# Patient Record
Sex: FEMALE | Race: WHITE | NOT HISPANIC OR LATINO | Employment: OTHER | URBAN - METROPOLITAN AREA
[De-identification: names, ages, dates, MRNs, and addresses within clinical notes are randomized per-mention and may not be internally consistent; named-entity substitution may affect disease eponyms.]

---

## 2024-04-20 ENCOUNTER — APPOINTMENT (EMERGENCY)
Dept: RADIOLOGY | Facility: HOSPITAL | Age: 69
End: 2024-04-20
Payer: COMMERCIAL

## 2024-04-20 ENCOUNTER — HOSPITAL ENCOUNTER (OUTPATIENT)
Facility: HOSPITAL | Age: 69
Setting detail: OBSERVATION
Discharge: HOME | End: 2024-04-21
Attending: EMERGENCY MEDICINE | Admitting: STUDENT IN AN ORGANIZED HEALTH CARE EDUCATION/TRAINING PROGRAM
Payer: COMMERCIAL

## 2024-04-20 DIAGNOSIS — R19.7 DIARRHEA: ICD-10-CM

## 2024-04-20 DIAGNOSIS — K52.9 PROCTOCOLITIS: Primary | ICD-10-CM

## 2024-04-20 DIAGNOSIS — R11.2 NAUSEA AND VOMITING: ICD-10-CM

## 2024-04-20 PROBLEM — M79.7 FIBROMYALGIA: Status: ACTIVE | Noted: 2024-04-20

## 2024-04-20 PROBLEM — E78.5 HLD (HYPERLIPIDEMIA): Status: ACTIVE | Noted: 2024-04-20

## 2024-04-20 LAB
ALBUMIN SERPL BCP-MCNC: 4.6 G/DL (ref 3.5–5)
ALP SERPL-CCNC: 91 U/L (ref 34–104)
ALT SERPL W P-5'-P-CCNC: 17 U/L (ref 7–52)
ANION GAP SERPL CALCULATED.3IONS-SCNC: 13 MMOL/L (ref 4–13)
AST SERPL W P-5'-P-CCNC: 22 U/L (ref 13–39)
BACTERIA UR QL AUTO: ABNORMAL /HPF
BASOPHILS # BLD MANUAL: 0 THOUSAND/UL (ref 0–0.1)
BASOPHILS NFR MAR MANUAL: 0 % (ref 0–1)
BILIRUB SERPL-MCNC: 1.11 MG/DL (ref 0.2–1)
BILIRUB UR QL STRIP: NEGATIVE
BUN SERPL-MCNC: 14 MG/DL (ref 5–25)
CALCIUM SERPL-MCNC: 9.7 MG/DL (ref 8.4–10.2)
CHLORIDE SERPL-SCNC: 100 MMOL/L (ref 96–108)
CLARITY UR: CLEAR
CO2 SERPL-SCNC: 24 MMOL/L (ref 21–32)
COLOR UR: YELLOW
CREAT SERPL-MCNC: 0.77 MG/DL (ref 0.6–1.3)
DACRYOCYTES BLD QL SMEAR: PRESENT
EOSINOPHIL # BLD MANUAL: 0 THOUSAND/UL (ref 0–0.4)
EOSINOPHIL NFR BLD MANUAL: 0 % (ref 0–6)
ERYTHROCYTE [DISTWIDTH] IN BLOOD BY AUTOMATED COUNT: 13.2 % (ref 11.6–15.1)
GFR SERPL CREATININE-BSD FRML MDRD: 79 ML/MIN/1.73SQ M
GLUCOSE SERPL-MCNC: 171 MG/DL (ref 65–140)
GLUCOSE UR STRIP-MCNC: ABNORMAL MG/DL
HCT VFR BLD AUTO: 44.3 % (ref 34.8–46.1)
HGB BLD-MCNC: 14.6 G/DL (ref 11.5–15.4)
HGB UR QL STRIP.AUTO: ABNORMAL
KETONES UR STRIP-MCNC: ABNORMAL MG/DL
LEUKOCYTE ESTERASE UR QL STRIP: NEGATIVE
LIPASE SERPL-CCNC: <6 U/L (ref 11–82)
LYMPHOCYTES # BLD AUTO: 0.44 THOUSAND/UL (ref 0.6–4.47)
LYMPHOCYTES # BLD AUTO: 2 % (ref 14–44)
MCH RBC QN AUTO: 28.6 PG (ref 26.8–34.3)
MCHC RBC AUTO-ENTMCNC: 33 G/DL (ref 31.4–37.4)
MCV RBC AUTO: 87 FL (ref 82–98)
MONOCYTES # BLD AUTO: 0.22 THOUSAND/UL (ref 0–1.22)
MONOCYTES NFR BLD: 2 % (ref 4–12)
NEUTROPHILS # BLD MANUAL: 10.16 THOUSAND/UL (ref 1.85–7.62)
NEUTS BAND NFR BLD MANUAL: 1 % (ref 0–8)
NEUTS SEG NFR BLD AUTO: 91 % (ref 43–75)
NITRITE UR QL STRIP: NEGATIVE
NON-SQ EPI CELLS URNS QL MICRO: ABNORMAL /HPF
PH UR STRIP.AUTO: 7 [PH] (ref 4.5–8)
PLASMA CELLS NFR BLD: 1 % (ref 0–0)
PLATELET # BLD AUTO: 321 THOUSANDS/UL (ref 149–390)
PLATELET BLD QL SMEAR: ADEQUATE
PMV BLD AUTO: 10.7 FL (ref 8.9–12.7)
POIKILOCYTOSIS BLD QL SMEAR: PRESENT
POTASSIUM SERPL-SCNC: 4.1 MMOL/L (ref 3.5–5.3)
PROMYELOCYTE ABSOLUTE CT: 0.22 THOUSAND/UL (ref 0–0)
PROMYELOCYTES NFR BLD MANUAL: 2 % (ref 0–0)
PROT SERPL-MCNC: 7.7 G/DL (ref 6.4–8.4)
PROT UR STRIP-MCNC: NEGATIVE MG/DL
RBC # BLD AUTO: 5.1 MILLION/UL (ref 3.81–5.12)
RBC #/AREA URNS AUTO: ABNORMAL /HPF
RBC MORPH BLD: PRESENT
SODIUM SERPL-SCNC: 137 MMOL/L (ref 135–147)
SP GR UR STRIP.AUTO: 1.02 (ref 1–1.03)
UROBILINOGEN UR QL STRIP.AUTO: 0.2 E.U./DL
VARIANT LYMPHS # BLD AUTO: 1 %
WBC # BLD AUTO: 11.04 THOUSAND/UL (ref 4.31–10.16)
WBC #/AREA URNS AUTO: ABNORMAL /HPF

## 2024-04-20 PROCEDURE — 80053 COMPREHEN METABOLIC PANEL: CPT

## 2024-04-20 PROCEDURE — 74177 CT ABD & PELVIS W/CONTRAST: CPT

## 2024-04-20 PROCEDURE — 1124F ACP DISCUSS-NO DSCNMKR DOCD: CPT | Performed by: EMERGENCY MEDICINE

## 2024-04-20 PROCEDURE — 96374 THER/PROPH/DIAG INJ IV PUSH: CPT

## 2024-04-20 PROCEDURE — 96361 HYDRATE IV INFUSION ADD-ON: CPT

## 2024-04-20 PROCEDURE — 99284 EMERGENCY DEPT VISIT MOD MDM: CPT

## 2024-04-20 PROCEDURE — 85027 COMPLETE CBC AUTOMATED: CPT

## 2024-04-20 PROCEDURE — 83690 ASSAY OF LIPASE: CPT

## 2024-04-20 PROCEDURE — 81001 URINALYSIS AUTO W/SCOPE: CPT

## 2024-04-20 PROCEDURE — 99285 EMERGENCY DEPT VISIT HI MDM: CPT | Performed by: EMERGENCY MEDICINE

## 2024-04-20 PROCEDURE — 96375 TX/PRO/DX INJ NEW DRUG ADDON: CPT

## 2024-04-20 PROCEDURE — 99223 1ST HOSP IP/OBS HIGH 75: CPT | Performed by: STUDENT IN AN ORGANIZED HEALTH CARE EDUCATION/TRAINING PROGRAM

## 2024-04-20 PROCEDURE — 36415 COLL VENOUS BLD VENIPUNCTURE: CPT

## 2024-04-20 PROCEDURE — 85007 BL SMEAR W/DIFF WBC COUNT: CPT

## 2024-04-20 RX ORDER — METRONIDAZOLE 500 MG/100ML
500 INJECTION, SOLUTION INTRAVENOUS EVERY 8 HOURS
Status: DISCONTINUED | OUTPATIENT
Start: 2024-04-20 | End: 2024-04-21 | Stop reason: HOSPADM

## 2024-04-20 RX ORDER — PRAVASTATIN SODIUM 40 MG
40 TABLET ORAL DAILY
Status: DISCONTINUED | OUTPATIENT
Start: 2024-04-21 | End: 2024-04-21 | Stop reason: HOSPADM

## 2024-04-20 RX ORDER — ONDANSETRON 2 MG/ML
4 INJECTION INTRAMUSCULAR; INTRAVENOUS ONCE
Status: COMPLETED | OUTPATIENT
Start: 2024-04-20 | End: 2024-04-20

## 2024-04-20 RX ORDER — ONDANSETRON 2 MG/ML
4 INJECTION INTRAMUSCULAR; INTRAVENOUS EVERY 6 HOURS PRN
Status: DISCONTINUED | OUTPATIENT
Start: 2024-04-20 | End: 2024-04-21 | Stop reason: HOSPADM

## 2024-04-20 RX ORDER — DROPERIDOL 2.5 MG/ML
1.25 INJECTION, SOLUTION INTRAMUSCULAR; INTRAVENOUS ONCE
Status: COMPLETED | OUTPATIENT
Start: 2024-04-20 | End: 2024-04-20

## 2024-04-20 RX ORDER — PRAVASTATIN SODIUM 40 MG
40 TABLET ORAL DAILY
COMMUNITY

## 2024-04-20 RX ORDER — SODIUM CHLORIDE, SODIUM GLUCONATE, SODIUM ACETATE, POTASSIUM CHLORIDE, MAGNESIUM CHLORIDE, SODIUM PHOSPHATE, DIBASIC, AND POTASSIUM PHOSPHATE .53; .5; .37; .037; .03; .012; .00082 G/100ML; G/100ML; G/100ML; G/100ML; G/100ML; G/100ML; G/100ML
100 INJECTION, SOLUTION INTRAVENOUS CONTINUOUS
Status: DISCONTINUED | OUTPATIENT
Start: 2024-04-20 | End: 2024-04-21 | Stop reason: HOSPADM

## 2024-04-20 RX ORDER — SUCRALFATE 1 G/1
1 TABLET ORAL ONCE
Status: COMPLETED | OUTPATIENT
Start: 2024-04-20 | End: 2024-04-20

## 2024-04-20 RX ORDER — ENOXAPARIN SODIUM 100 MG/ML
40 INJECTION SUBCUTANEOUS DAILY
Status: DISCONTINUED | OUTPATIENT
Start: 2024-04-21 | End: 2024-04-21 | Stop reason: HOSPADM

## 2024-04-20 RX ORDER — FAMOTIDINE 10 MG/ML
20 INJECTION, SOLUTION INTRAVENOUS ONCE
Status: COMPLETED | OUTPATIENT
Start: 2024-04-20 | End: 2024-04-20

## 2024-04-20 RX ORDER — DICYCLOMINE HCL 20 MG
20 TABLET ORAL
Status: DISCONTINUED | OUTPATIENT
Start: 2024-04-20 | End: 2024-04-21 | Stop reason: HOSPADM

## 2024-04-20 RX ADMIN — ONDANSETRON 4 MG: 2 INJECTION INTRAMUSCULAR; INTRAVENOUS at 12:10

## 2024-04-20 RX ADMIN — METRONIDAZOLE 500 MG: 500 INJECTION, SOLUTION INTRAVENOUS at 16:10

## 2024-04-20 RX ADMIN — FAMOTIDINE 20 MG: 10 INJECTION INTRAVENOUS at 12:17

## 2024-04-20 RX ADMIN — DICYCLOMINE HYDROCHLORIDE 20 MG: 20 TABLET ORAL at 18:03

## 2024-04-20 RX ADMIN — SODIUM CHLORIDE, SODIUM GLUCONATE, SODIUM ACETATE, POTASSIUM CHLORIDE, MAGNESIUM CHLORIDE, SODIUM PHOSPHATE, DIBASIC, AND POTASSIUM PHOSPHATE 100 ML/HR: .53; .5; .37; .037; .03; .012; .00082 INJECTION, SOLUTION INTRAVENOUS at 17:33

## 2024-04-20 RX ADMIN — DROPERIDOL 1.25 MG: 2.5 INJECTION, SOLUTION INTRAMUSCULAR; INTRAVENOUS at 13:32

## 2024-04-20 RX ADMIN — SODIUM CHLORIDE, SODIUM GLUCONATE, SODIUM ACETATE, POTASSIUM CHLORIDE, MAGNESIUM CHLORIDE, SODIUM PHOSPHATE, DIBASIC, AND POTASSIUM PHOSPHATE 100 ML/HR: .53; .5; .37; .037; .03; .012; .00082 INJECTION, SOLUTION INTRAVENOUS at 22:47

## 2024-04-20 RX ADMIN — SUCRALFATE 1 G: 1 TABLET ORAL at 12:10

## 2024-04-20 RX ADMIN — DICYCLOMINE HYDROCHLORIDE 20 MG: 20 TABLET ORAL at 22:46

## 2024-04-20 RX ADMIN — IOHEXOL 100 ML: 350 INJECTION, SOLUTION INTRAVENOUS at 13:57

## 2024-04-20 RX ADMIN — SODIUM CHLORIDE 1000 ML: 0.9 INJECTION, SOLUTION INTRAVENOUS at 12:11

## 2024-04-20 NOTE — H&P
[facility]  H&P  Name: Bessy East 69 y.o. female I MRN: [id number]  Unit/Bed#: ED 27 I Date of Admission: 4/20/2024   Date of Service: 4/20/2024 I Hospital Day: 0      Assessment/Plan   Proctocolitis  Assessment & Plan  Presenting with 1 week history of diarrhea which appears to be improving few days ago however became worse 1 day prior to admission with accompanying nausea and lower abdominal pain with poor oral intake over the past 1 week.  Diarrhea appears to be slightly improved and she has not had any further bowel movements in the last 1 hour.  Reports having 8 bowel movements last night.  Recent antibiotic use for dental infection prophylaxis approximately 1 month ago, patient does not know which antibiotic she received.  Mild leukocytosis of 11.04 in the ED, lipase negative  Afebrile with no SIRS  CT imaging with thickened sigmoid and rectum compatible with nonspecific proctocolitis  Received Pepcid, droperidol, Zofran, 1 L NS bolus, Carafate in the ED with improvement in symptoms  Patient was started on Flagyl in ED, continue  Clear liquid diet, advance as tolerated  Stool studies ordered  Antiemetics Bentyl as needed ordered  IV fluids    Fibromyalgia  Assessment & Plan  Previously on Cymbalta and gabapentin, no longer taking  Supportive care    HLD (hyperlipidemia)  Assessment & Plan  Continue pravastatin         VTE Pharmacologic Prophylaxis: VTE Score: 3 Moderate Risk (Score 3-4) - Pharmacological DVT Prophylaxis Ordered: enoxaparin (Lovenox).  Code Status: Level 1 - Full Code   Discussion with family: Patient declined call to .     Anticipated Length of Stay: Patient will be admitted on an observation basis with an anticipated length of stay of less than 2 midnights secondary to proctocolitis, diarrhea requiring IV fluids anticipate discharge tomorrow if patient is tolerating diet.    Total Time Spent on Date of Encounter in care of patient: 65  mins. This time was spent on one or more of the following: performing physical exam; counseling and coordination of care; obtaining or reviewing history; documenting in the medical record; reviewing/ordering tests, medications or procedures; communicating with other healthcare professionals and discussing with patient's family/caregivers.    Chief Complaint: Diarrhea, abdominal pain    History of Present Illness:  Bessy East is a 69 y.o. female with a PMH of fibromyalgia, hiatal hernia s/p repair who presents with 1 week history of diarrhea with poor oral intake.  Patient reports that her diarrhea started approximately 1 week ago and appeared to be improving 3 days ago however became worse last night fluids as much as 8 episodes of bowel movement.  She was already concert last night.  Denies eating much anything for the last 1 week due to nausea and diarrhea and poor appetite.  Received unknown antibiotics approximately 1 month ago when she needed to have a dental implant placed.  No fevers or chills.  Reports mild left abdominal crampy pain.  Reports some blood noted in her stool last night towards end of her multiple episodes, none today.  Hypertensive on admission however repeat blood pressures improved significantly.  Mild leukocytosis at 11.  Lipase negative.  CT abdomen pelvis with signs of thickened sigmoid and rectum compatible with nonspecific proctocolitis.  No prior history of IBD but does report history of IBS approximately 30 years ago.  Patient reports feeling better after she received droperidol, 1 L fluid bolus, Zofran, Carafate, Pepcid in the ED.  No further bowel movements in the last 5 hours.    She currently resides in [place] and was only here for the concert with plans to return back to [place] tomorrow if she is feeling better.    Review of Systems:  Review of Systems   Constitutional:  Positive for chills. Negative for fever.   HENT:  Negative for congestion and sinus pressure.     Eyes: Negative.    Respiratory: Negative.     Cardiovascular: Negative.    Gastrointestinal:  Positive for abdominal pain, diarrhea and nausea. Negative for constipation and vomiting.   Genitourinary:  Negative for dyspareunia and dysuria.   Musculoskeletal:  Negative for back pain and gait problem.   Skin:  Negative for rash and wound.   Neurological:  Negative for light-headedness and headaches.   Psychiatric/Behavioral:  Negative for agitation and confusion.        Past Medical and Surgical History:   Past Medical History:   Diagnosis Date    Hypercholesteremia        No past surgical history on file.    Meds/Allergies:  Prior to Admission medications    Medication Sig Start Date End Date Taking? Authorizing Provider   pravastatin (PRAVACHOL) 40 mg tablet Take 40 mg by mouth daily   Yes Historical Provider, MD DIETRICH have reviewed home medications with patient personally.    Allergies:   Allergies   Allergen Reactions    Penicillins Hives    Sulfa Antibiotics Hives       Social History:  Marital Status: /Civil Union   Substance Use History:   Social History     Substance and Sexual Activity   Alcohol Use Not on file     Social History     Tobacco Use   Smoking Status Not on file   Smokeless Tobacco Not on file     Social History     Substance and Sexual Activity   Drug Use Not on file       Family History:  No family history on file.    Physical Exam:     Vitals:   Blood Pressure: 113/62 (04/20/24 1739)  Pulse: 64 (04/20/24 1739)  Temperature: 97.6 °F (36.4 °C) (04/20/24 1115)  Respirations: 16 (04/20/24 1739)  SpO2: 93 % (04/20/24 1739)    Physical Exam  Vitals reviewed.   Constitutional:       General: She is not in acute distress.     Appearance: Normal appearance. She is not ill-appearing.   HENT:      Head: Normocephalic and atraumatic.   Cardiovascular:      Rate and Rhythm: Normal rate and regular rhythm.      Heart sounds: Normal heart sounds.   Pulmonary:      Effort: Pulmonary effort is normal.  No respiratory distress.   Abdominal:      General: Bowel sounds are normal. There is no distension.      Palpations: Abdomen is soft.      Tenderness: There is abdominal tenderness (Mild tenderness in the left lower quadrant). There is no guarding or rebound.   Musculoskeletal:      Right lower leg: No edema.      Left lower leg: No edema.   Skin:     General: Skin is warm and dry.   Neurological:      Mental Status: She is alert and oriented to person, place, and time. Mental status is at baseline.          Additional Data:     Lab Results:  Results from last 7 days   Lab Units 04/20/24  1210   WBC Thousand/uL 11.04*   HEMOGLOBIN g/dL 14.6   HEMATOCRIT % 44.3   PLATELETS Thousands/uL 321   BANDS PCT % 1   LYMPHO PCT % 2*   MONO PCT % 2*   EOS PCT % 0     Results from last 7 days   Lab Units 04/20/24  1210   SODIUM mmol/L 137   POTASSIUM mmol/L 4.1   CHLORIDE mmol/L 100   CO2 mmol/L 24   BUN mg/dL 14   CREATININE mg/dL 0.77   ANION GAP mmol/L 13   CALCIUM mg/dL 9.7   ALBUMIN g/dL 4.6   TOTAL BILIRUBIN mg/dL 1.11*   ALK PHOS U/L 91   ALT U/L 17   AST U/L 22   GLUCOSE RANDOM mg/dL 171*                       Lines/Drains:  Invasive Devices       Peripheral Intravenous Line  Duration             Peripheral IV 04/20/24 Left;Proximal;Ventral (anterior) Forearm <1 day                        Imaging: Reviewed radiology reports from this admission including: abdominal/pelvic CT  CT abdomen pelvis with contrast   Final Result by Abel Durham MD (04/20 1446)      Thickened sigmoid and rectum compatible with a nonspecific proctocolitis.      The study was marked in EPIC for immediate notification.      Workstation performed: [id number]             EKG and Other Studies Reviewed on Admission:   EKG: No EKG obtained.    ** Please Note: This note has been constructed using a voice recognition system. **

## 2024-04-20 NOTE — ASSESSMENT & PLAN NOTE
Presenting with 1 week history of diarrhea which appears to be improving few days ago however became worse 1 day prior to admission with accompanying nausea and lower abdominal pain with poor oral intake over the past 1 week.  Diarrhea appears to be slightly improved and she has not had any further bowel movements in the last 1 hour.  Reports having 8 bowel movements last night.  Recent antibiotic use for dental infection prophylaxis approximately 1 month ago, patient does not know which antibiotic she received.  Mild leukocytosis of 11.04 in the ED, lipase negative  Afebrile with no SIRS  CT imaging with thickened sigmoid and rectum compatible with nonspecific proctocolitis  Received Pepcid, droperidol, Zofran, 1 L NS bolus, Carafate in the ED with improvement in symptoms  Patient was started on Flagyl in ED, continue  Clear liquid diet, advance as tolerated  Stool studies ordered  Antiemetics Bentyl as needed ordered  IV fluids

## 2024-04-20 NOTE — ED PROVIDER NOTES
History  Chief Complaint   Patient presents with    Diarrhea     Pt having diarrhea and nausea since Monday, states it is worse today, last BM  hr ago     69-year-old female with history of hyperlipidemia, IBS, recent tick bite treated with antibiotics, just finished course of antibiotics for dental infection, presents emergency department due to abdominal pain, diarrhea, nausea and vomiting.  Symptoms have progressed over the past week.  She was at a concert last night and developed recurrent episodes of emesis throughout the night.  She has not been able to hold anything down today.  She also having diarrhea that she describes as darker in color than typical.  Her friend brought her to the hospital because she looked unwell. Denies f/c, cp, sob, or other complaints.     Prior to Admission Medications   Prescriptions Last Dose Informant Patient Reported? Taking?   pravastatin (PRAVACHOL) 40 mg tablet 4/19/2024  Yes Yes   Sig: Take 40 mg by mouth daily      Facility-Administered Medications: None       Past Medical History:   Diagnosis Date    Hypercholesteremia        No past surgical history on file.    No family history on file.  I have reviewed and agree with the history as documented.    E-Cigarette/Vaping     E-Cigarette/Vaping Substances           Review of Systems   All other systems reviewed and are negative.      Physical Exam  ED Triage Vitals   Temperature Pulse Respirations Blood Pressure SpO2   04/20/24 1115 04/20/24 1115 04/20/24 1115 04/20/24 1115 04/20/24 1115   97.6 °F (36.4 °C) 60 20 (!) 189/98 100 %      Temp Source Heart Rate Source Patient Position - Orthostatic VS BP Location FiO2 (%)   04/21/24 0752 04/20/24 1230 04/20/24 1230 04/20/24 1230 --   Oral Monitor Lying Right arm       Pain Score       04/20/24 1300       5             Orthostatic Vital Signs  Vitals:    04/20/24 1739 04/20/24 1800 04/20/24 2222 04/21/24 0752   BP: 113/62 118/75 106/71 98/60   Pulse: 64 74 60 60   Patient Position  - Orthostatic VS: Lying Sitting  Lying       Physical Exam  Vitals and nursing note reviewed.   Constitutional:       General: She is not in acute distress.     Appearance: She is well-developed.   HENT:      Head: Normocephalic and atraumatic.   Eyes:      Conjunctiva/sclera: Conjunctivae normal.   Cardiovascular:      Rate and Rhythm: Normal rate and regular rhythm.      Heart sounds: No murmur heard.  Pulmonary:      Effort: Pulmonary effort is normal. No respiratory distress.      Breath sounds: Normal breath sounds.   Abdominal:      Palpations: Abdomen is soft.      Tenderness: There is abdominal tenderness (epigastric).   Musculoskeletal:         General: No swelling.      Cervical back: Neck supple.   Skin:     General: Skin is warm and dry.      Capillary Refill: Capillary refill takes less than 2 seconds.   Neurological:      Mental Status: She is alert.   Psychiatric:         Mood and Affect: Mood normal.         ED Medications  Medications   metroNIDAZOLE (FLAGYL) IVPB (premix) 500 mg 100 mL (500 mg Intravenous New Bag 4/21/24 0950)   pravastatin (PRAVACHOL) tablet 40 mg (40 mg Oral Given 4/21/24 0950)   enoxaparin (LOVENOX) subcutaneous injection 40 mg (40 mg Subcutaneous Not Given 4/21/24 0959)   ondansetron (ZOFRAN) injection 4 mg (has no administration in time range)   multi-electrolyte (PLASMALYTE-A/ISOLYTE-S PH 7.4) IV solution (100 mL/hr Intravenous New Bag 4/20/24 2247)   dicyclomine (BENTYL) tablet 20 mg (20 mg Oral Given 4/21/24 1058)   ondansetron (ZOFRAN) injection 4 mg (4 mg Intravenous Given 4/20/24 1210)   Famotidine (PF) (PEPCID) injection 20 mg (20 mg Intravenous Given 4/20/24 1217)   sucralfate (CARAFATE) tablet 1 g (1 g Oral Given 4/20/24 1210)   sodium chloride 0.9 % bolus 1,000 mL (0 mL Intravenous Stopped 4/20/24 1430)   droperidol (INAPSINE) injection 1.25 mg (1.25 mg Intravenous Given 4/20/24 1332)   iohexol (OMNIPAQUE) 350 MG/ML injection (MULTI-DOSE) 100 mL (100 mL Intravenous  Given 4/20/24 1357)   potassium chloride (Klor-Con M20) CR tablet 40 mEq (40 mEq Oral Given 4/21/24 0950)       Diagnostic Studies  Results Reviewed       Procedure Component Value Units Date/Time    Basic metabolic panel [[id number]]  (Abnormal) Collected: 04/21/24 0447    Lab Status: Final result Specimen: Blood from Arm, Left Updated: 04/21/24 0533     Sodium 139 mmol/L      Potassium 3.3 mmol/L      Chloride 104 mmol/L      CO2 26 mmol/L      ANION GAP 9 mmol/L      BUN 12 mg/dL      Creatinine 0.93 mg/dL      Glucose 92 mg/dL      Calcium 9.0 mg/dL      eGFR 63 ml/min/1.73sq m     Narrative:      National Kidney Disease Foundation guidelines for Chronic Kidney Disease (CKD):     Stage 1 with normal or high GFR (GFR > 90 mL/min/1.73 square meters)    Stage 2 Mild CKD (GFR = 60-89 mL/min/1.73 square meters)    Stage 3A Moderate CKD (GFR = 45-59 mL/min/1.73 square meters)    Stage 3B Moderate CKD (GFR = 30-44 mL/min/1.73 square meters)    Stage 4 Severe CKD (GFR = 15-29 mL/min/1.73 square meters)    Stage 5 End Stage CKD (GFR <15 mL/min/1.73 square meters)  Note: GFR calculation is accurate only with a steady state creatinine    CBC and differential [[id number]] Collected: 04/21/24 0447    Lab Status: Final result Specimen: Blood from Arm, Left Updated: 04/21/24 0517     WBC 8.52 Thousand/uL      RBC 4.60 Million/uL      Hemoglobin 12.9 g/dL      Hematocrit 40.3 %      MCV 88 fL      MCH 28.0 pg      MCHC 32.0 g/dL      RDW 13.2 %      MPV 10.5 fL      Platelets 296 Thousands/uL      nRBC 0 /100 WBCs      Segmented % 66 %      Immature Grans % 0 %      Lymphocytes % 22 %      Monocytes % 11 %      Eosinophils Relative 0 %      Basophils Relative 1 %      Absolute Neutrophils 5.62 Thousands/µL      Absolute Immature Grans 0.02 Thousand/uL      Absolute Lymphocytes 1.88 Thousands/µL      Absolute Monocytes 0.92 Thousand/µL      Eosinophils Absolute 0.03 Thousand/µL      Basophils Absolute 0.05 Thousands/µL      Clostridium difficile toxin by PCR with EIA [[id number]]     Lab Status: No result Specimen: Stool from Per Rectum     Stool Enteric Bacterial Panel by PCR [[id number]]     Lab Status: No result Specimen: Stool from Rectum     Urine Microscopic [[id number]]  (Abnormal) Collected: 04/20/24 1352    Lab Status: Final result Specimen: Urine, Clean Catch Updated: 04/20/24 1459     RBC, UA 10-20 /hpf      WBC, UA None Seen /hpf      Epithelial Cells None Seen /hpf      Bacteria, UA None Seen /hpf     Urine Macroscopic, POC [[id number]]  (Abnormal) Collected: 04/20/24 1352    Lab Status: Final result Specimen: Urine Updated: 04/20/24 1353     Color, UA Yellow     Clarity, UA Clear     pH, UA 7.0     Leukocytes, UA Negative     Nitrite, UA Negative     Protein, UA Negative mg/dl      Glucose,  (1/10%) mg/dl      Ketones, UA 40 (2+) mg/dl      Urobilinogen, UA 0.2 E.U./dl      Bilirubin, UA Negative     Occult Blood, UA Small     Specific Gravity, UA 1.025    Narrative:      CLINITEK RESULT    Comprehensive metabolic panel [[id number]]  (Abnormal) Collected: 04/20/24 1210    Lab Status: Final result Specimen: Blood from Arm, Left Updated: 04/20/24 1308     Sodium 137 mmol/L      Potassium 4.1 mmol/L      Chloride 100 mmol/L      CO2 24 mmol/L      ANION GAP 13 mmol/L      BUN 14 mg/dL      Creatinine 0.77 mg/dL      Glucose 171 mg/dL      Calcium 9.7 mg/dL      AST 22 U/L      ALT 17 U/L      Alkaline Phosphatase 91 U/L      Total Protein 7.7 g/dL      Albumin 4.6 g/dL      Total Bilirubin 1.11 mg/dL      eGFR 79 ml/min/1.73sq m     Narrative:      National Kidney Disease Foundation guidelines for Chronic Kidney Disease (CKD):     Stage 1 with normal or high GFR (GFR > 90 mL/min/1.73 square meters)    Stage 2 Mild CKD (GFR = 60-89 mL/min/1.73 square meters)    Stage 3A Moderate CKD (GFR = 45-59 mL/min/1.73 square meters)    Stage 3B Moderate CKD (GFR = 30-44 mL/min/1.73 square meters)    Stage 4 Severe CKD (GFR = 15-29  mL/min/1.73 square meters)    Stage 5 End Stage CKD (GFR <15 mL/min/1.73 square meters)  Note: GFR calculation is accurate only with a steady state creatinine    Lipase [[id number]]  (Abnormal) Collected: 04/20/24 1210    Lab Status: Final result Specimen: Blood from Arm, Left Updated: 04/20/24 1308     Lipase <6 u/L     RBC Morphology Reflex Test [[id number]] Collected: 04/20/24 1210    Lab Status: Final result Specimen: Blood from Arm, Left Updated: 04/20/24 1302    CBC and differential [[id number]]  (Abnormal) Collected: 04/20/24 1210    Lab Status: Final result Specimen: Blood from Arm, Left Updated: 04/20/24 1252     WBC 11.04 Thousand/uL      RBC 5.10 Million/uL      Hemoglobin 14.6 g/dL      Hematocrit 44.3 %      MCV 87 fL      MCH 28.6 pg      MCHC 33.0 g/dL      RDW 13.2 %      MPV 10.7 fL      Platelets 321 Thousands/uL     Narrative:      This is an appended report.  These results have been appended to a previously verified report.    Manual Differential(PHLEBS Do Not Order) [[id number]]  (Abnormal) Collected: 04/20/24 1210    Lab Status: Final result Specimen: Blood from Arm, Left Updated: 04/20/24 1252     Segmented % 91 %      Bands % 1 %      Lymphocytes % 2 %      Monocytes % 2 %      Eosinophils % 0 %      Basophils % 0 %      Promyelocytes % 2 %      Atypical Lymphocytes % 1 %      Plasma Cells % 1 %      Absolute Neutrophils 10.16 Thousand/uL      Absolute Lymphocytes 0.44 Thousand/uL      Absolute Monocytes 0.22 Thousand/uL      Absolute Eosinophils 0.00 Thousand/uL      Absolute Basophils 0.00 Thousand/uL      Absolute Promyelocytes 0.22 Thousand/uL      Total Counted --     RBC Morphology Present     Platelet Estimate Adequate     Poikilocytes Present     Tear Drop Cells Present                   CT abdomen pelvis with contrast   Final Result by Abel Durham MD (04/20 1446)      Thickened sigmoid and rectum compatible with a nonspecific proctocolitis.      The study was marked in EPIC  for immediate notification.      Workstation performed: [id number]               Procedures  Procedures      ED Course  ED Course as of 04/21/24 1327   Sat Apr 20, 2024   1314 Total Bilirubin(!): 1.11                             SBIRT 20yo+      Flowsheet Row Most Recent Value   Initial Alcohol Screen: US AUDIT-C     1. How often do you have a drink containing alcohol? 0 Filed at: 04/20/2024 1306   2. How many drinks containing alcohol do you have on a typical day you are drinking?  0 Filed at: 04/20/2024 1306   3a. Male UNDER 65: How often do you have five or more drinks on one occasion? 0 Filed at: 04/20/2024 1306   3b. FEMALE Any Age, or MALE 65+: How often do you have 4 or more drinks on one occassion? 0 Filed at: 04/20/2024 1306   Audit-C Score 0 Filed at: 04/20/2024 1306   MARYURI: How many times in the past year have you...    Used an illegal drug or used a prescription medication for non-medical reasons? Never Filed at: 04/20/2024 1306                  Medical Decision Making  61-year-old female present emergency department due to diffuse diarrhea, vomiting, unclear etiology.  Will obtain abdominal labs, CT abdomen pelvis to evaluate for surgical pathology.  Labs to evaluate for leukocytosis, electrolyte abnormalities.  Symptomatic management provided.  Workup showing proctocolitis.  Patient requiring multiple repeat medications to try and control vomiting and discomfort.  Patient unable to tolerate p.o. intake.  Will treat with antibiotics.  Discussed options including home management versus admission for further control of emesis.  Patient opting towards admission at this time.    Amount and/or Complexity of Data Reviewed  Labs: ordered. Decision-making details documented in ED Course.  Radiology: ordered.    Risk  Prescription drug management.  Decision regarding hospitalization.          Disposition  Final diagnoses:   Proctocolitis   Nausea and vomiting   Diarrhea     Time reflects when diagnosis was  documented in both MDM as applicable and the Disposition within this note       Time User Action Codes Description Comment    4/20/2024  4:08 PM Yokubaitis, Armani Add [K52.9] Proctocolitis     4/20/2024  4:09 PM Yokubaitis, Armani Add [R11.2] Nausea and vomiting     4/20/2024  4:09 PM Yokubaitis, Armani Add [R19.7] Diarrhea           ED Disposition       ED Disposition   Admit    Condition   Stable    Date/Time   Sat Apr 20, 2024 1616    Comment   Case was discussed with selma and the patient's admission status was agreed to be Admission Status: observation status to the service of slim .               Follow-up Information    None         Discharge Medication List as of 4/21/2024 10:02 AM        START taking these medications    Details   metroNIDAZOLE (FLAGYL) 500 mg tablet Take 1 tablet (500 mg total) by mouth every 8 (eight) hours for 6 days, Starting Sun 4/21/2024, Until Sat 4/27/2024, Normal      ondansetron (ZOFRAN) 4 mg tablet Take 1 tablet (4 mg total) by mouth every 8 (eight) hours as needed for nausea or vomiting, Starting Sun 4/21/2024, Normal           CONTINUE these medications which have NOT CHANGED    Details   pravastatin (PRAVACHOL) 40 mg tablet Take 40 mg by mouth daily, Historical Med           Outpatient Discharge Orders   Discharge Diet     Activity as tolerated     Call provider for:  persistent nausea or vomiting     Call provider for:  severe uncontrolled pain       PDMP Review       None             ED Provider  Attending physically available and evaluated Bessy East. I managed the patient along with the ED Attending.    Electronically Signed by           Armani Rodriguez MD  04/21/24 6489

## 2024-04-20 NOTE — ED ATTENDING ATTESTATION
4/20/2024  I, Geovani Hratley MD, saw and evaluated the patient. I have discussed the patient with the resident/non-physician practitioner and agree with the resident's/non-physician practitioner's findings, Plan of Care, and MDM as documented in the resident's/non-physician practitioner's note, except where noted. All available labs and Radiology studies were reviewed.  I was present for key portions of any procedure(s) performed by the resident/non-physician practitioner and I was immediately available to provide assistance.       At this point I agree with the current assessment done in the Emergency Department.  I have conducted an independent evaluation of this patient a history and physical is as follows:    69-year-old woman presenting with lower abdominal pain, nausea, vomiting and diarrhea.  On exam she is awake and alert, uncomfortable appearing.  Heart regular rate and rhythm, no murmurs rubs or gallops.  Lungs clear to auscultation bilaterally.  Abdomen soft and nondistended.  There is bilateral lower quadrant tenderness with no rebound or guarding.  Skin warm and dry.  Will get labs, imaging, treat symptoms.    ED Course         Critical Care Time  Procedures

## 2024-04-21 VITALS
OXYGEN SATURATION: 96 % | SYSTOLIC BLOOD PRESSURE: 98 MMHG | HEART RATE: 60 BPM | DIASTOLIC BLOOD PRESSURE: 60 MMHG | RESPIRATION RATE: 16 BRPM | TEMPERATURE: 98.4 F

## 2024-04-21 LAB
ANION GAP SERPL CALCULATED.3IONS-SCNC: 9 MMOL/L (ref 4–13)
BASOPHILS # BLD AUTO: 0.05 THOUSANDS/ÂΜL (ref 0–0.1)
BASOPHILS NFR BLD AUTO: 1 % (ref 0–1)
BUN SERPL-MCNC: 12 MG/DL (ref 5–25)
CALCIUM SERPL-MCNC: 9 MG/DL (ref 8.4–10.2)
CHLORIDE SERPL-SCNC: 104 MMOL/L (ref 96–108)
CO2 SERPL-SCNC: 26 MMOL/L (ref 21–32)
CREAT SERPL-MCNC: 0.93 MG/DL (ref 0.6–1.3)
EOSINOPHIL # BLD AUTO: 0.03 THOUSAND/ÂΜL (ref 0–0.61)
EOSINOPHIL NFR BLD AUTO: 0 % (ref 0–6)
ERYTHROCYTE [DISTWIDTH] IN BLOOD BY AUTOMATED COUNT: 13.2 % (ref 11.6–15.1)
GFR SERPL CREATININE-BSD FRML MDRD: 63 ML/MIN/1.73SQ M
GLUCOSE SERPL-MCNC: 92 MG/DL (ref 65–140)
HCT VFR BLD AUTO: 40.3 % (ref 34.8–46.1)
HGB BLD-MCNC: 12.9 G/DL (ref 11.5–15.4)
IMM GRANULOCYTES # BLD AUTO: 0.02 THOUSAND/UL (ref 0–0.2)
IMM GRANULOCYTES NFR BLD AUTO: 0 % (ref 0–2)
LYMPHOCYTES # BLD AUTO: 1.88 THOUSANDS/ÂΜL (ref 0.6–4.47)
LYMPHOCYTES NFR BLD AUTO: 22 % (ref 14–44)
MCH RBC QN AUTO: 28 PG (ref 26.8–34.3)
MCHC RBC AUTO-ENTMCNC: 32 G/DL (ref 31.4–37.4)
MCV RBC AUTO: 88 FL (ref 82–98)
MONOCYTES # BLD AUTO: 0.92 THOUSAND/ÂΜL (ref 0.17–1.22)
MONOCYTES NFR BLD AUTO: 11 % (ref 4–12)
NEUTROPHILS # BLD AUTO: 5.62 THOUSANDS/ÂΜL (ref 1.85–7.62)
NEUTS SEG NFR BLD AUTO: 66 % (ref 43–75)
NRBC BLD AUTO-RTO: 0 /100 WBCS
PLATELET # BLD AUTO: 296 THOUSANDS/UL (ref 149–390)
PMV BLD AUTO: 10.5 FL (ref 8.9–12.7)
POTASSIUM SERPL-SCNC: 3.3 MMOL/L (ref 3.5–5.3)
RBC # BLD AUTO: 4.6 MILLION/UL (ref 3.81–5.12)
SODIUM SERPL-SCNC: 139 MMOL/L (ref 135–147)
WBC # BLD AUTO: 8.52 THOUSAND/UL (ref 4.31–10.16)

## 2024-04-21 PROCEDURE — 85025 COMPLETE CBC W/AUTO DIFF WBC: CPT | Performed by: STUDENT IN AN ORGANIZED HEALTH CARE EDUCATION/TRAINING PROGRAM

## 2024-04-21 PROCEDURE — 99238 HOSP IP/OBS DSCHRG MGMT 30/<: CPT | Performed by: PHYSICIAN ASSISTANT

## 2024-04-21 PROCEDURE — 80048 BASIC METABOLIC PNL TOTAL CA: CPT | Performed by: STUDENT IN AN ORGANIZED HEALTH CARE EDUCATION/TRAINING PROGRAM

## 2024-04-21 RX ORDER — METRONIDAZOLE 500 MG/1
500 TABLET ORAL EVERY 8 HOURS SCHEDULED
Qty: 18 TABLET | Refills: 0 | Status: SHIPPED | OUTPATIENT
Start: 2024-04-21 | End: 2024-04-27

## 2024-04-21 RX ORDER — ONDANSETRON 4 MG/1
4 TABLET, FILM COATED ORAL EVERY 8 HOURS PRN
Qty: 20 TABLET | Refills: 0 | Status: SHIPPED | OUTPATIENT
Start: 2024-04-21

## 2024-04-21 RX ORDER — POTASSIUM CHLORIDE 20 MEQ/1
40 TABLET, EXTENDED RELEASE ORAL ONCE
Status: COMPLETED | OUTPATIENT
Start: 2024-04-21 | End: 2024-04-21

## 2024-04-21 RX ADMIN — PRAVASTATIN SODIUM 40 MG: 40 TABLET ORAL at 09:50

## 2024-04-21 RX ADMIN — DICYCLOMINE HYDROCHLORIDE 20 MG: 20 TABLET ORAL at 06:31

## 2024-04-21 RX ADMIN — METRONIDAZOLE 500 MG: 500 INJECTION, SOLUTION INTRAVENOUS at 09:50

## 2024-04-21 RX ADMIN — POTASSIUM CHLORIDE 40 MEQ: 1500 TABLET, EXTENDED RELEASE ORAL at 09:50

## 2024-04-21 RX ADMIN — METRONIDAZOLE 500 MG: 500 INJECTION, SOLUTION INTRAVENOUS at 00:05

## 2024-04-21 RX ADMIN — DICYCLOMINE HYDROCHLORIDE 20 MG: 20 TABLET ORAL at 10:58

## 2024-04-21 NOTE — ASSESSMENT & PLAN NOTE
Presenting with 1 week history of diarrhea which appears to be improving few days ago however became worse 1 day prior to admission with accompanying nausea and lower abdominal pain with poor oral intake over the past 1 week.  Diarrhea appears to be slightly improved and she has not had any further bowel movements in the last 1 hour.  Reports having 8 bowel movements night prior to admission.  Recent antibiotic use for dental infection prophylaxis approximately 1 month ago, patient does not know which antibiotic she received.  Mild leukocytosis of 11.04 in the ED, lipase negative  Afebrile with no SIRS  CT imaging with thickened sigmoid and rectum compatible with nonspecific proctocolitis  Received Pepcid, droperidol, Zofran, 1 L NS bolus, Carafate in the ED with improvement in symptoms  Patient was started on Flagyl in ED, continue course at discharge   Stool studies ordered, not obtained as she has had no further bouts of diarrhea.    Resolved.  Abd pain, diarrhea, nausea resolved.  Tolerating PO.  Sable for d/c

## 2024-04-21 NOTE — DISCHARGE SUMMARY
[facility]  Discharge- Bessy East 1955, 69 y.o. female MRN: [id number]  Unit/Bed#: 2 212-01 Encounter: [id number]  Primary Care Provider: No primary care provider on file.   Date and time admitted to hospital: 4/20/2024 11:26 AM    * Proctocolitis  Assessment & Plan  Presenting with 1 week history of diarrhea which appears to be improving few days ago however became worse 1 day prior to admission with accompanying nausea and lower abdominal pain with poor oral intake over the past 1 week.  Diarrhea appears to be slightly improved and she has not had any further bowel movements in the last 1 hour.  Reports having 8 bowel movements night prior to admission.  Recent antibiotic use for dental infection prophylaxis approximately 1 month ago, patient does not know which antibiotic she received.  Mild leukocytosis of 11.04 in the ED, lipase negative  Afebrile with no SIRS  CT imaging with thickened sigmoid and rectum compatible with nonspecific proctocolitis  Received Pepcid, droperidol, Zofran, 1 L NS bolus, Carafate in the ED with improvement in symptoms  Patient was started on Flagyl in ED, continue course at discharge   Stool studies ordered, not obtained as she has had no further bouts of diarrhea.    Resolved.  Abd pain, diarrhea, nausea resolved.  Tolerating PO.  Sable for d/c     Fibromyalgia  Assessment & Plan  Previously on Cymbalta and gabapentin, no longer taking  Supportive care    HLD (hyperlipidemia)  Assessment & Plan  Continue pravastatin        Medical Problems       Resolved Problems  Date Reviewed: 4/21/2024   None       Discharging Physician / Practitioner: Vicky Thomas PA-C  PCP: No primary care provider on file.  Admission Date:   Admission Orders (From admission, onward)       Ordered        04/20/24 1617  Place in Observation  Once                          Discharge Date: 04/21/24    Consultations During Hospital Stay:  None    Procedures  Performed:   None    Significant Findings / Test Results:   CT a/p thickened sigmoid and rectum compatible with nonspecific proctocolitis    Incidental Findings:   None    Test Results Pending at Discharge (will require follow up):   None     Outpatient Tests Requested:  None    Complications: None    Reason for Admission: Colitis    Hospital Course:   Bessy East is a 69 y.o. female patient who originally presented to the hospital on 4/20/2024 due to nausea, vomiting, abdominal pain and diarrhea.  She reports these GI symptoms ongoing for about a week, symptoms had seemed to improve but then returned day prior to admission with several episodes of diarrhea.  CT imaging with evidence of nonspecific proctocolitis.  She was given GI cocktail along with IV fluids in ED with significant improvement of her sx.  She was started on flagyl and observed overnight.  She reports she feels much better today, she denies any further episodes of n/v/d.  Denies abd pain.  Tolerating PO.  Stable for d/c.     Please see above list of diagnoses and related plan for additional information.     Condition at Discharge: good    Discharge Day Visit / Exam:   Subjective:  No acute complaints, feels better today.    Vitals: Blood Pressure: 98/60 (04/21/24 0752)  Pulse: 60 (04/21/24 0752)  Temperature: 98.4 °F (36.9 °C) (04/21/24 0752)  Temp Source: Oral (04/21/24 0752)  Respirations: 16 (04/21/24 0752)  SpO2: 96 % (04/21/24 0752)  Exam:   Physical Exam  Vitals reviewed.   Constitutional:       General: She is not in acute distress.     Appearance: She is not toxic-appearing.   HENT:      Head: Normocephalic and atraumatic.   Eyes:      Extraocular Movements: Extraocular movements intact.   Cardiovascular:      Rate and Rhythm: Normal rate and regular rhythm.   Pulmonary:      Effort: Pulmonary effort is normal. No respiratory distress.   Musculoskeletal:         General: Normal range of motion.   Neurological:      General: No focal  deficit present.      Mental Status: She is alert and oriented to person, place, and time.   Psychiatric:         Mood and Affect: Mood normal.         Behavior: Behavior normal.         Thought Content: Thought content normal.          Discussion with Family: Patient declined call to .     Discharge instructions/Information to patient and family:   See after visit summary for information provided to patient and family.      Provisions for Follow-Up Care:  See after visit summary for information related to follow-up care and any pertinent home health orders.      Mobility at time of Discharge:           Disposition:   Home    Planned Readmission: no     Discharge Statement:  I spent 25 minutes discharging the patient. This time was spent on the day of discharge. I had direct contact with the patient on the day of discharge. Greater than 50% of the total time was spent examining patient, answering all patient questions, arranging and discussing plan of care with patient as well as directly providing post-discharge instructions.  Additional time then spent on discharge activities.    Discharge Medications:  See after visit summary for reconciled discharge medications provided to patient and/or family.      **Please Note: This note may have been constructed using a voice recognition system**

## 2024-04-21 NOTE — DISCHARGE INSTR - AVS FIRST PAGE
Take flagyl 500 mg every 8 hours for 6 more days.  Do not consume alcohol while taking flagyl and for 3 days after your stop taking it.  You may take zofran as needed for nausea or vomiting.  Follow up with primary care in 1 week.